# Patient Record
Sex: MALE | Race: WHITE | Employment: FULL TIME | URBAN - METROPOLITAN AREA
[De-identification: names, ages, dates, MRNs, and addresses within clinical notes are randomized per-mention and may not be internally consistent; named-entity substitution may affect disease eponyms.]

---

## 2019-04-02 ENCOUNTER — OFFICE VISIT (OUTPATIENT)
Dept: ENDOCRINOLOGY | Age: 49
End: 2019-04-02

## 2019-04-02 VITALS
BODY MASS INDEX: 33.21 KG/M2 | HEART RATE: 77 BPM | SYSTOLIC BLOOD PRESSURE: 148 MMHG | HEIGHT: 72 IN | DIASTOLIC BLOOD PRESSURE: 98 MMHG | WEIGHT: 245.2 LBS

## 2019-04-02 DIAGNOSIS — E78.5 HYPERLIPIDEMIA LDL GOAL <100: ICD-10-CM

## 2019-04-02 DIAGNOSIS — R03.0 ELEVATED BLOOD-PRESSURE READING WITHOUT DIAGNOSIS OF HYPERTENSION: ICD-10-CM

## 2019-04-02 LAB
GLUCOSE POC: 97 MG/DL
HBA1C MFR BLD HPLC: 8.8 %

## 2019-04-02 RX ORDER — INSULIN LISPRO 100 [IU]/ML
INJECTION, SOLUTION INTRAVENOUS; SUBCUTANEOUS
COMMUNITY
End: 2019-09-24 | Stop reason: SDUPTHER

## 2019-04-02 NOTE — PROGRESS NOTES
Chief Complaint Patient presents with  Diabetes  
  pcp is [facility] and pharmacy confimred History of Present Illness: Cora Lipscomb is a 50 y.o. male who is a new patient for evaluation of diabetes. Was being seen by Dr. Loreta Cai and last visit was with her NP in June 2018. Was diagnosed with diabetes around age 27 and initially was put on pills but was seen by an endo who confirmed he had Type 1 diabetes and was taken off pills and put on insulin and has remained on this ever since. Recalls being on lantus initially and then was changed to toujeo in the past 2-3 years. Has always been on humalog. Current regimen is toujeo 36 units at night and humalog 1:10 for carbs and 1:40> 140 for correction. Checks blood sugars 1-3 times per day and readings run from low overnight a few times a month to over 200 fasting depending on how late he ate at night and what he ate. If he doesn't eat for hours during the day, doesn't tend to drop. Can have times during the day where he eats and is on the go and may not dose the humalog. May underdose his humalog at night to avoid low blood sugars. Most recent Hgb A1c was 8.8% in 4/19 down from 8.9% in 6/18. A typical day is as follows: 
- breakfast: 2 fast food biscuits, usually no other sides 
- lunch: may skip and just have a fiber one bar or pack of nabs, slice of pizza or arby's or burger dustin and get a combo meal 
- dinner: turkey sandwich and some chips, occ pasta, not much rice or potato, may have desserts most nights of the week - beverages: pineapple juice 16 oz per day, diet soda, unsweet tea, G2 
- snacks: may have chips, crackers or cookies late at night and tries to take humalog for this Exercise consists of active on the job or during the remodel process. No history of vascular disease. No history of retinopathy, neuropathy, or nephropathy. Last eye exam was Feb 2018.   States his BP has been intermittently elevated. His blood sugar was down to 56 during our interview and I gave him 8 oz of sweet tea and it was up to 97 by the time he left. Past Medical History:  
Diagnosis Date  Arthritis  Depression  Hyperlipidemia LDL goal <100   
 Skin cancer  Type 1 diabetes (HCC) Age 27 Past Surgical History:  
Procedure Laterality Date  HX ORTHOPAEDIC    
 plantar fasciitis surgery on left foot  HX ORTHOPAEDIC    
 left elbow tendon surgery  HX OTHER SURGICAL  2009  
 bilateral lymphnodectomies axilla  HX OTHER SURGICAL    
 mole removed from back  HX SHOULDER ARTHROSCOPY Bilateral   
 
Current Outpatient Medications Medication Sig  
 insulin glargine,hum.rec.anlog (TOUJEO SOLOSTAR U-300 INSULIN SC) 36 Units by SubCUTAneous route nightly.  insulin lispro (HUMALOG KWIKPEN INSULIN) 100 unit/mL kwikpen by SubCUTAneous route. 1:10 for carbs and 1:40 > 140 for correction No current facility-administered medications for this visit. No Known Allergies Family History Problem Relation Age of Onset  Heart Disease Father CABG  
 Diabetes Sister 15  
     type 1 Social History Socioeconomic History  Marital status:  Spouse name: Not on file  Number of children: Not on file  Years of education: Not on file  Highest education level: Not on file Occupational History  Not on file Social Needs  Financial resource strain: Not on file  Food insecurity:  
  Worry: Not on file Inability: Not on file  Transportation needs:  
  Medical: Not on file Non-medical: Not on file Tobacco Use  Smoking status: Former Smoker  Smokeless tobacco: Current User Types: Chew  Tobacco comment: as a teenager Substance and Sexual Activity  Alcohol use: Yes Comment: a couple beers a month  Drug use: No  
 Sexual activity: Not on file Lifestyle  Physical activity:  
  Days per week: Not on file Minutes per session: Not on file  Stress: Not on file Relationships  Social connections:  
  Talks on phone: Not on file Gets together: Not on file Attends Baptism service: Not on file Active member of club or organization: Not on file Attends meetings of clubs or organizations: Not on file Relationship status: Not on file  Intimate partner violence:  
  Fear of current or ex partner: Not on file Emotionally abused: Not on file Physically abused: Not on file Forced sexual activity: Not on file Other Topics Concern  Not on file Social History Narrative Lives in [facility] with wife and 3 yo son Also has a 22 yo daughter and a 12 yo son by a previous marriage. Works as a  for [address] In the process of remodeling a house in [place] that he plans to move to. Review of Systems: 
- Constitutional Symptoms: no fevers, chills, (+) 40 weight gain over the past several years - Eyes: no blurry vision or double vision - Cardiovascular: no chest pain or palpitations - Respiratory: no cough or shortness of breath 
- Gastrointestinal: no dysphagia or abdominal pain - Musculoskeletal: (+) knee pains - Integumentary: no rashes - Neurological: no numbness, tingling, some sinus headaches - Psychiatric: no depression or anxiety - Endocrine: no heat or cold intolerance, no polyuria or polydipsia Physical Examination: 
Blood pressure (!) 150/91, pulse 77, height 6' (1.829 m), weight 245 lb 3.2 oz (111.2 kg). Repeat manually 146/98 in left arm. 
- General: pleasant, no distress, good eye contact 
- HEENT: no exopthalmos, no periorbital edema, no scleral/conjunctival injection, EOMI, no lid lag or stare 
- Neck: supple, no thyromegaly, masses, lymph nodes, or carotid bruits, no supraclavicular or dorsocervical fat pads - Cardiovascular: regular, normal rate, normal S1 and S2, no murmurs/rubs/gallops, 
- Respiratory: clear to auscultation bilaterally - Gastrointestinal: soft, nontender, nondistended, no masses, no hepatosplenomegaly - Musculoskeletal: no proximal muscle weakness in upper or lower extremities - Integumentary: no acanthosis nigricans, no abdominal striae, no rashes, no edema, no foot ulcers - Neurological: see foot exam 
- Psychiatric: normal mood and affect Diabetic foot exam:  
 
Left Foot: 
 Visual Exam: callous - mild Pulse DP: 2+ (normal) Filament test: reduced sensation Vibratory sensation: Vibratory sensation: diminished Right Foot: 
 Visual Exam: callous - mild Pulse DP: 2+ (normal) Filament test: reduced sensation Vibratory sensation: Vibratory sensation: diminished Data Reviewed: 6/5/18 
- Hgb A1c 8.9% 
- lipids: total 182 ,  HDL 45, TG 77,  
- ALT 21, AST 18 
- BUN/Cr 18/1.06 
- total testosterone 547 
- vitamin D 37 
- B12 786 Component Latest Ref Rng & Units 4/2/2019  
 
      3:26 PM  
Hemoglobin A1c (POC) 
    % 8.8 Component Latest Ref Rng & Units 4/2/2019  
 
      4:32 PM  
GLUCOSE,FAST - POC 
    mg/dL 97 Assessment/Plan: 1. Diabetes mellitus type 1, uncontrolled, without [facility] his most recent Hgb A1c was 8.8% in 4/19. I'm concerned that he doesn't have enough meal time insulin due to his carb ratio not being aggressive enough so will change this. Will keep his toujeo the same for now. He needs to do a better job of making sure he gets his humalog every time he eats. - cont toujeo 36 units at night 
- change humalog to 1:8 for carbs during the day and 1:10 if he eats after 10pm 
- cont humalog 1:40 > 140 for correction - check bs 3 times daily 
- foot exam done 4/19 
- overdue for eye exam--last 2/18 
- check cmp and microalbumin today 
- check A1c and bmp prior to next visit 2. Hyperlipidemia LDL goal <100:  in 6/18 
- check lipids today and prior to next visit 3. Elevated blood-pressure reading without diagnosis of hypertension: 
- monitor home blood pressure readings We spent 60 minutes of face to face time together and > 50% of the time was spent in counseling on diabetes management and determining what changes to make to his insulin regimen. Patient Instructions 1) Your A1c is 8.8% which is down slightly from 8.9% in June. Our goal is 7% or less which is an average sugar of 154 or less. 2) Keep taking toujeo 36 units at night. 3) Try dosing humalog 1 unit for 8 grams of carbs during the day but if you eat after 10pm, dose 1 for 10 to be safe. 4) Try to write down some sugars before meals and how much insulin you took and what you ate so we can identify trends. Also write down if you didn't dose so we know what caused your spikes. 5) Stay on 1 unit for every 40 points over 140 for correction for now. 6) Start monitoring blood pressure about 2-3 times per week at alternating times either in the morning or evening after resting for 5 minutes and sitting upright in a chair with your arm at heart level. Please let me know if you are having readings over 140 on the top number or 90 on the bottom number. 7) I will call you or send you a letter with your lab results. 8) Let me know when you need refills on your meds. 9) If you have a low going into a meal, plan on counting the carbs and eat first to get your sugar up and then take 2 units less. 10) I will mail you a lab slip. Please put this in the glove compartment and I will send you a letter to remind you to have your labs drawn prior to the next visit. Follow-up and Dispositions · Return in about 3 months (around 7/2/2019) for ok to use 12:10. Copy sent to: 
None at this time Lab follow up: 4/6/19 Sent him the following message in a letter: 
 
MICROALBUMIN, UR, RAND W/ MICROALB/CREAT RATIO (Collected: 4/2/2019  4:12 PM) Result Value Ref Range Creatinine, urine 188.6 Not Estab. mg/dL Microalbumin, urine 50.3 Not Estab. ug/mL Microalb/Creat ratio (ug/mg creat.) 26.7 0.0 - 30.0 mg/g creat Comment:  
                        Normal:                0.0 -  30.0 Albuminuria:          31.0 - 300.0 Clinical albuminuria:       >300.0 Narrative Performed at:  [address]  [id number] : Reji Bang MD, Phone:  [phone] METABOLIC PANEL, COMPREHENSIVE (Collected: 4/2/2019  4:12 PM) Result Value Ref Range Glucose 65 65 - 99 mg/dL BUN 19 6 - 24 mg/dL Creatinine 0.85 0.76 - 1.27 mg/dL GFR est non- >59 mL/min/1.73 GFR est  >59 mL/min/1.73  
 BUN/Creatinine ratio 22 (H) 9 - 20 Sodium 144 134 - 144 mmol/L Potassium 4.1 3.5 - 5.2 mmol/L Chloride 102 96 - 106 mmol/L  
 CO2 25 20 - 29 mmol/L Calcium 9.7 8.7 - 10.2 mg/dL Protein, total 7.1 6.0 - 8.5 g/dL Albumin 4.8 3.5 - 5.5 g/dL GLOBULIN, TOTAL 2.3 1.5 - 4.5 g/dL A-G Ratio 2.1 1.2 - 2.2 Bilirubin, total 0.4 0.0 - 1.2 mg/dL Alk. phosphatase 70 39 - 117 IU/L  
 AST (SGOT) 21 0 - 40 IU/L  
 ALT (SGPT) 27 0 - 44 IU/L Narrative Performed at:  [address]  [id number] : Reji Bang MD, Phone:  [phone] LIPID PANEL (Collected: 4/2/2019  4:12 PM) Result Value Ref Range Cholesterol, total 222 (H) 100 - 199 mg/dL Triglyceride 92 0 - 149 mg/dL HDL Cholesterol 54 >39 mg/dL VLDL, calculated 18 5 - 40 mg/dL LDL, calculated 150 (H) 0 - 99 mg/dL Narrative Performed at:  [address]  [id number] : Reji Bang MD, Phone:  [phone] AMB POC GLUCOSE BLOOD, BY GLUCOSE MONITORING DEVICE (Collected: 4/2/2019  4:32 PM) Result Value Ref Range Glucose POC 97 mg/dL CVD REPORT (Collected: 4/2/2019  4:12 PM) Result Value Ref Range INTERPRETATION Note Comment:  
   Medical Director's Note: Patient Last Name has been 
corrected on 4/3/2019, was KENTON and now is VICK. Please review this report in its entirety, since changes to 
patient demographics may affect result interpretation(s) 
and/or treatment/follow-up suggestions. Medical Director's Note: Patient First Name has been 
corrected on 4/3/2019, was Dianne Quintana and now is AMELIA. Please review this report in its entirety, since changes to 
patient demographics may affect result interpretation(s) 
and/or treatment/follow-up suggestions. Supplemental report is available. Narrative Performed at:  [address] [place] [address]  [id number] : Arin Delarosa MD, Phone:  [phone] I wanted to update you on your recent lab results: 
 
------------------------------------------------------------------------------------------------------------------- Total Cholesterol is the total number of cholesterol particles in your blood. Goal is less than 200. Triglycerides are the short term fats in your blood. Goal is less than 150. HDL is the good cholesterol in your blood. Goal is more than 50 if you are a woman and 40 if you are a man. LDL is the bad cholesterol in your blood. Goal is less than 100 unless you have heart disease and then goal is under 70. We may need to start a medication for cholesterol at the next visit but let's see the effect of controlling your diabetes better and working on your diet first. 
 
Continue to follow a low cholesterol diet.   Try to limit the amount of fried foods, fatty foods, butter, gravy, red meat, ice cream, cheese, and eggs in your diet, which are all high in cholesterol. 
------------------------------------------------------------------------------------------------------------------- 
 BUN and creatinine are markers of kidney function. Your values are normal. 
------------------------------------------------------------------------------------------------------------------- 
ALT and AST are markers of liver function. Your values are normal. 
------------------------------------------------------------------------------------------------------------------- Microalbumin/creatinine ratio is a marker of the amount of protein in your urine. Goal is less than 30. Your value is normal. This indicates that your kidneys are not being affected by your uncontrolled diabetes and/or blood pressure.

## 2019-04-02 NOTE — PATIENT INSTRUCTIONS
1) Your A1c is 8.8% which is down slightly from 8.9% in June. Our goal is 7% or less which is an average sugar of 154 or less. 2) Keep taking toujeo 36 units at night. 3) Try dosing humalog 1 unit for 8 grams of carbs during the day but if you eat after 10pm, dose 1 for 10 to be safe. 4) Try to write down some sugars before meals and how much insulin you took and what you ate so we can identify trends. Also write down if you didn't dose so we know what caused your spikes. 5) Stay on 1 unit for every 40 points over 140 for correction for now. 6) Start monitoring blood pressure about 2-3 times per week at alternating times either in the morning or evening after resting for 5 minutes and sitting upright in a chair with your arm at heart level. Please let me know if you are having readings over 140 on the top number or 90 on the bottom number. 7) I will call you or send you a letter with your lab results. 8) Let me know when you need refills on your meds. 9) If you have a low going into a meal, plan on counting the carbs and eat first to get your sugar up and then take 2 units less. 10) I will mail you a lab slip. Please put this in the glove compartment and I will send you a letter to remind you to have your labs drawn prior to the next visit.

## 2019-04-03 LAB
ALBUMIN SERPL-MCNC: 4.8 G/DL (ref 3.5–5.5)
ALBUMIN/CREAT UR: 26.7 MG/G CREAT (ref 0–30)
ALBUMIN/GLOB SERPL: 2.1 {RATIO} (ref 1.2–2.2)
ALP SERPL-CCNC: 70 IU/L (ref 39–117)
ALT SERPL-CCNC: 27 IU/L (ref 0–44)
AST SERPL-CCNC: 21 IU/L (ref 0–40)
BILIRUB SERPL-MCNC: 0.4 MG/DL (ref 0–1.2)
BUN SERPL-MCNC: 19 MG/DL (ref 6–24)
BUN/CREAT SERPL: 22 (ref 9–20)
CALCIUM SERPL-MCNC: 9.7 MG/DL (ref 8.7–10.2)
CHLORIDE SERPL-SCNC: 102 MMOL/L (ref 96–106)
CHOLEST SERPL-MCNC: 222 MG/DL (ref 100–199)
CO2 SERPL-SCNC: 25 MMOL/L (ref 20–29)
CREAT SERPL-MCNC: 0.85 MG/DL (ref 0.76–1.27)
CREAT UR-MCNC: 188.6 MG/DL
GLOBULIN SER CALC-MCNC: 2.3 G/DL (ref 1.5–4.5)
GLUCOSE SERPL-MCNC: 65 MG/DL (ref 65–99)
HDLC SERPL-MCNC: 54 MG/DL
INTERPRETATION, 910389: NORMAL
LDLC SERPL CALC-MCNC: 150 MG/DL (ref 0–99)
MICROALBUMIN UR-MCNC: 50.3 UG/ML
POTASSIUM SERPL-SCNC: 4.1 MMOL/L (ref 3.5–5.2)
PROT SERPL-MCNC: 7.1 G/DL (ref 6–8.5)
SODIUM SERPL-SCNC: 144 MMOL/L (ref 134–144)
TRIGL SERPL-MCNC: 92 MG/DL (ref 0–149)
VLDLC SERPL CALC-MCNC: 18 MG/DL (ref 5–40)

## 2019-06-25 LAB
BUN SERPL-MCNC: 17 MG/DL (ref 6–24)
BUN/CREAT SERPL: 16 (ref 9–20)
CALCIUM SERPL-MCNC: 9.1 MG/DL (ref 8.7–10.2)
CHLORIDE SERPL-SCNC: 98 MMOL/L (ref 96–106)
CHOLEST SERPL-MCNC: 191 MG/DL (ref 100–199)
CO2 SERPL-SCNC: 24 MMOL/L (ref 20–29)
CREAT SERPL-MCNC: 1.09 MG/DL (ref 0.76–1.27)
EST. AVERAGE GLUCOSE BLD GHB EST-MCNC: 226 MG/DL
GLUCOSE SERPL-MCNC: 289 MG/DL (ref 65–99)
HBA1C MFR BLD: 9.5 % (ref 4.8–5.6)
HDLC SERPL-MCNC: 47 MG/DL
INTERPRETATION, 910389: NORMAL
LDLC SERPL CALC-MCNC: 120 MG/DL (ref 0–99)
Lab: NORMAL
POTASSIUM SERPL-SCNC: 4.5 MMOL/L (ref 3.5–5.2)
SODIUM SERPL-SCNC: 136 MMOL/L (ref 134–144)
TRIGL SERPL-MCNC: 118 MG/DL (ref 0–149)
VLDLC SERPL CALC-MCNC: 24 MG/DL (ref 5–40)

## 2019-07-08 ENCOUNTER — OFFICE VISIT (OUTPATIENT)
Dept: ENDOCRINOLOGY | Age: 49
End: 2019-07-08

## 2019-07-08 VITALS
BODY MASS INDEX: 31.62 KG/M2 | HEIGHT: 73 IN | WEIGHT: 238.6 LBS | SYSTOLIC BLOOD PRESSURE: 140 MMHG | HEART RATE: 79 BPM | DIASTOLIC BLOOD PRESSURE: 94 MMHG

## 2019-07-08 DIAGNOSIS — E78.5 HYPERLIPIDEMIA LDL GOAL <100: ICD-10-CM

## 2019-07-08 DIAGNOSIS — R03.0 ELEVATED BLOOD-PRESSURE READING WITHOUT DIAGNOSIS OF HYPERTENSION: ICD-10-CM

## 2019-07-08 RX ORDER — INSULIN DEGLUDEC 100 U/ML
INJECTION, SOLUTION SUBCUTANEOUS
Qty: 1 PEN | Refills: 0 | Status: SHIPPED | COMMUNITY
Start: 2019-07-08 | End: 2019-12-09

## 2019-07-08 NOTE — PROGRESS NOTES
Chief Complaint   Patient presents with    Diabetes     pcp is [facility] and pharmacy confirmed     History of Present Illness: Krystal Nogueira is a 50 y.o. male here for follow up of diabetes. Weight down 7 lbs since last visit in 4/19. Has still been doing his humalog at 1:10 and then will add a few units. Still taking toujeo 36 units at night. Did run out 3 days ago and has a new shipment on its way and sugars are in the 300s over the weekend out of long acting insulin. Can have fasting sugars in the 200s if he eats dinner late. Has only had a few lows when he is active on the job but nothing on a regular basis. Admits to still eating a lot of sweets. Hasn't checked his BP outside of the doctor's office. Current Outpatient Medications   Medication Sig    insulin glargine,hum.rec.anlog (TOUJEO SOLOSTAR U-300 INSULIN SC) 36 Units by SubCUTAneous route nightly.  insulin lispro (HUMALOG KWIKPEN INSULIN) 100 unit/mL kwikpen by SubCUTAneous route. 1:10 for carbs and 1:40 > 140 for correction     No current facility-administered medications for this visit. No Known Allergies     Review of Systems:  - Eyes: no blurry vision or double vision  - Cardiovascular: no chest pain  - Respiratory: no shortness of breath  - Musculoskeletal: no myalgias  - Neurological: no numbness/tingling in extremities    Physical Examination:  Blood pressure (!) 140/94, pulse 79, height 6' 1\" (1.854 m), weight 238 lb 9.6 oz (108.2 kg).  Repeat manually 158/98 in left arm.  - General: pleasant, no distress, good eye contact   - Neck: no carotid bruits  - Cardiovascular: regular, normal rate, nl s1 and s2, no m/r/g,   - Respiratory: clear bilaterally  - Integumentary: no edema,   - Psychiatric: normal mood and affect    Data Reviewed:   Component      Latest Ref Rng & Units 6/24/2019 6/24/2019 6/24/2019           9:09 AM  9:09 AM  9:09 AM   Glucose      65 - 99 mg/dL   289 (H)   BUN      6 - 24 mg/dL   17 Creatinine      0.76 - 1.27 mg/dL   1.09   GFR est non-AA      >59 mL/min/1.73   80   GFR est AA      >59 mL/min/1.73   92   BUN/Creatinine ratio      9 - 20   16   Sodium      134 - 144 mmol/L   136   Potassium      3.5 - 5.2 mmol/L   4.5   Chloride      96 - 106 mmol/L   98   CO2      20 - 29 mmol/L   24   Calcium      8.7 - 10.2 mg/dL   9.1   Cholesterol, total      100 - 199 mg/dL  191    Triglyceride      0 - 149 mg/dL  118    HDL Cholesterol      >39 mg/dL  47    VLDL, calculated      5 - 40 mg/dL  24    LDL, calculated      0 - 99 mg/dL  120 (H)    Hemoglobin A1c, (calculated)      4.8 - 5.6 % 9.5 (H)     Estimated average glucose      mg/dL 226         Assessment/Plan:     1. Diabetes mellitus type 1, uncontrolled, [facility] complications [facility] his most recent Hgb A1c was 9.5% in 6/19 up from 8.8% in 4/19. A1c is higher due to diet but will slightly increase toujeo to get fasting sugars lower. - increase toujeo to 38 units at night  - cont humalog 1:8 for carbs during the day and 1:10 if he eats after 10pm  - cont humalog 1:40 > 140 for correction  - check bs 3 times daily  - foot exam done 4/19  - microalbumin nl 4/19  - overdue for eye exam--last 2/18  - check A1c and cmp prior to next visit        2. Hyperlipidemia LDL goal <100:  in 6/18, up to 150 in 4/19, down to 120 in 6/19  - check lipids prior to next visit        3. Elevated blood-pressure reading without diagnosis of hypertension: BP up again today  - monitor home blood pressure readings        Patient Instructions   1) Your A1c josé from 8.8% to 9.5% so let's try going up on the toujeo to 38 units daily. 2) Try to dose the humalog at 1:8 for carbs to prevent spikes but at bedtime dose 1:10 to avoid lows overnight. 3) For the next week, take the tresiba sample at 38 units at night. 4) Your LDL (bad cholesterol) dropped from 150 to 120 which is a good improvement.     Try to limit the amount of fried foods, fatty foods, butter, gravy, red meat, ice cream, cheese and eggs in your diet, which are all high in cholesterol. 5) BUN and creatinine are markers of kidney function. Your values are normal.    6) Start monitoring blood pressure about 2-3 times per week at alternating times either in the morning or evening after resting for 5 minutes and sitting upright in a chair with your arm at heart level. Please let me know if you are having readings over 140 on the top number or 90 on the bottom number. Follow-up and Dispositions    · Return in about 5 months (around 12/8/2019).                Copy sent to:  None at this time

## 2019-07-08 NOTE — PATIENT INSTRUCTIONS
1) Your A1c josé from 8.8% to 9.5% so let's try going up on the toujeo to 38 units daily. 2) Try to dose the humalog at 1:8 for carbs to prevent spikes but at bedtime dose 1:10 to avoid lows overnight. 3) For the next week, take the tresiba sample at 38 units at night. 4) Your LDL (bad cholesterol) dropped from 150 to 120 which is a good improvement. Try to limit the amount of fried foods, fatty foods, butter, gravy, red meat, ice cream, cheese and eggs in your diet, which are all high in cholesterol. 5) BUN and creatinine are markers of kidney function. Your values are normal.    6) Start monitoring blood pressure about 2-3 times per week at alternating times either in the morning or evening after resting for 5 minutes and sitting upright in a chair with your arm at heart level. Please let me know if you are having readings over 140 on the top number or 90 on the bottom number.

## 2019-09-24 RX ORDER — INSULIN LISPRO 100 [IU]/ML
INJECTION, SOLUTION INTRAVENOUS; SUBCUTANEOUS
Qty: 45 ML | Refills: 3 | Status: SHIPPED | OUTPATIENT
Start: 2019-09-24 | End: 2020-02-03 | Stop reason: SDUPTHER

## 2019-09-24 RX ORDER — BLOOD SUGAR DIAGNOSTIC
STRIP MISCELLANEOUS
Qty: 300 STRIP | Refills: 3 | Status: SHIPPED | OUTPATIENT
Start: 2019-09-24 | End: 2020-02-03 | Stop reason: SDUPTHER

## 2019-09-24 NOTE — TELEPHONE ENCOUNTER
----- Message from Bong Bustamante sent at 9/24/2019  2:36 PM EDT -----  Regarding: Dr Augustine Brown  Medication Refill    Caller (if not patient):      Relationship of caller (if not patient):      Best contact number(s): [phone]      Name of medication and dosage if known: Humalog and test strips      Is patient out of this medication (yes/no): yes/ test strips      Pharmacy name: [facility] listed in chart? (yes/no):   Pharmacy phone Radha Bo      Details to clarify the request:      Bong Bustamante

## 2019-09-24 NOTE — TELEPHONE ENCOUNTER
I spoke with patient and he stated that he uses the verio one touch verio meter . I informed him that the strips and insulin will be sent to his mail order.

## 2019-12-06 LAB
ALBUMIN SERPL-MCNC: 4.4 G/DL (ref 3.5–5.5)
ALBUMIN/GLOB SERPL: 2.3 {RATIO} (ref 1.2–2.2)
ALP SERPL-CCNC: 61 IU/L (ref 39–117)
ALT SERPL-CCNC: 20 IU/L (ref 0–44)
AST SERPL-CCNC: 20 IU/L (ref 0–40)
BILIRUB SERPL-MCNC: 0.3 MG/DL (ref 0–1.2)
BUN SERPL-MCNC: 21 MG/DL (ref 6–24)
BUN/CREAT SERPL: 23 (ref 9–20)
CALCIUM SERPL-MCNC: 9 MG/DL (ref 8.7–10.2)
CHLORIDE SERPL-SCNC: 102 MMOL/L (ref 96–106)
CHOLEST SERPL-MCNC: 188 MG/DL (ref 100–199)
CO2 SERPL-SCNC: 27 MMOL/L (ref 20–29)
CREAT SERPL-MCNC: 0.9 MG/DL (ref 0.76–1.27)
EST. AVERAGE GLUCOSE BLD GHB EST-MCNC: 200 MG/DL
GLOBULIN SER CALC-MCNC: 1.9 G/DL (ref 1.5–4.5)
GLUCOSE SERPL-MCNC: 110 MG/DL (ref 65–99)
HBA1C MFR BLD: 8.6 % (ref 4.8–5.6)
HDLC SERPL-MCNC: 45 MG/DL
INTERPRETATION, 910389: NORMAL
LDLC SERPL CALC-MCNC: 113 MG/DL (ref 0–99)
Lab: NORMAL
POTASSIUM SERPL-SCNC: 4.6 MMOL/L (ref 3.5–5.2)
PROT SERPL-MCNC: 6.3 G/DL (ref 6–8.5)
SODIUM SERPL-SCNC: 144 MMOL/L (ref 134–144)
TRIGL SERPL-MCNC: 150 MG/DL (ref 0–149)
VLDLC SERPL CALC-MCNC: 30 MG/DL (ref 5–40)

## 2019-12-09 ENCOUNTER — OFFICE VISIT (OUTPATIENT)
Dept: ENDOCRINOLOGY | Age: 49
End: 2019-12-09

## 2019-12-09 VITALS
HEART RATE: 69 BPM | BODY MASS INDEX: 31.86 KG/M2 | HEIGHT: 73 IN | WEIGHT: 240.4 LBS | SYSTOLIC BLOOD PRESSURE: 136 MMHG | DIASTOLIC BLOOD PRESSURE: 90 MMHG

## 2019-12-09 DIAGNOSIS — E78.5 HYPERLIPIDEMIA LDL GOAL <100: ICD-10-CM

## 2019-12-09 DIAGNOSIS — R03.0 ELEVATED BLOOD-PRESSURE READING WITHOUT DIAGNOSIS OF HYPERTENSION: ICD-10-CM

## 2019-12-09 NOTE — PROGRESS NOTES
Chief Complaint   Patient presents with    Diabetes     PCP and Pharmacy verified     History of Present Illness: Jean Claude Ford is a 52 y.o. male here for follow up of diabetes. Weight up 2 lbs since last visit in 7/19. Has been taking toujeo 38 units at night and dosing humalog 1:10 but tends to add about a unit to help get closer to 1:8 for food. His schedule has been erratic and sometimes will have dinner at 5pm and other times at 11pm.  The other night went to [employer] and missed his humalog before dinner and sugar was almost 500 by bedtime and then took a correction dose and down to 96 in the morning. Does have some fasting sugars in the 100-150 range and other times can be \"hi\" if he falls asleep at night and forgets his toujeo which isn't that often. Still has a lot of 200-300s. Didn't get a chance to check some home BP readings. Current Outpatient Medications   Medication Sig    insulin lispro (HUMALOG KWIKPEN INSULIN) 100 unit/mL kwikpen Inject 1:8 for carbs and 1:40 > 140 for correction. Max 50 units per day    ONETOUCH VERIO strip Test sugar 3 times daily.  insulin glargine,hum.rec.anlog (TOUJEO SOLOSTAR U-300 INSULIN SC) 38 Units by SubCUTAneous route nightly. No current facility-administered medications for this visit. No Known Allergies     Review of Systems:  - Eyes: no blurry vision or double vision  - Cardiovascular: no chest pain  - Respiratory: no shortness of breath  - Musculoskeletal: no myalgias  - Neurological: no numbness/tingling in extremities    Physical Examination:  Blood pressure 136/90, pulse 69, height 6' 1\" (1.854 m), weight 240 lb 6.4 oz (109 kg).   - General: pleasant, no distress, good eye contact   - Neck: no carotid bruits  - Cardiovascular: regular, normal rate, nl s1 and s2, no m/r/g,   - Respiratory: clear bilaterally  - Integumentary: no edema,   - Psychiatric: normal mood and affect    Data Reviewed:   Component      Latest Ref Rng & Units 12/5/2019 12/5/2019 12/5/2019           3:54 PM  3:54 PM  3:54 PM   Glucose      65 - 99 mg/dL 110 (H)     BUN      6 - 24 mg/dL 21     Creatinine      0.76 - 1.27 mg/dL 0.90     GFR est non-AA      >59 mL/min/1.73 100     GFR est AA      >59 mL/min/1.73 116     BUN/Creatinine ratio      9 - 20 23 (H)     Sodium      134 - 144 mmol/L 144     Potassium      3.5 - 5.2 mmol/L 4.6     Chloride      96 - 106 mmol/L 102     CO2      20 - 29 mmol/L 27     Calcium      8.7 - 10.2 mg/dL 9.0     Protein, total      6.0 - 8.5 g/dL 6.3     Albumin      3.5 - 5.5 g/dL 4.4     GLOBULIN, TOTAL      1.5 - 4.5 g/dL 1.9     A-G Ratio      1.2 - 2.2 2.3 (H)     Bilirubin, total      0.0 - 1.2 mg/dL 0.3     Alk. phosphatase      39 - 117 IU/L 61     AST      0 - 40 IU/L 20     ALT (SGPT)      0 - 44 IU/L 20     Cholesterol, total      100 - 199 mg/dL  188    Triglyceride      0 - 149 mg/dL  150 (H)    HDL Cholesterol      >39 mg/dL  45    VLDL, calculated      5 - 40 mg/dL  30    LDL, calculated      0 - 99 mg/dL  113 (H)    Hemoglobin A1c, (calculated)      4.8 - 5.6 %   8.6 (H)   Estimated average glucose      mg/dL   200       Assessment/Plan:     1. Diabetes mellitus type 1, uncontrolled, without complications ([facility]): his most recent Hgb A1c was 8.6% in 12/19 down from 9.5% in 6/19 up from 8.8% in 4/19. A1c is coming down slowly but needs to focus on compliance before making any changes. - cont toujeo 38 units at night  - cont humalog 1:8 for carbs during the day and 1:10 if he eats after 10pm  - cont humalog 1:40 > 140 for correction  - check bs 3 times daily  - foot exam done 4/19  - microalbumin nl 4/19  - overdue for eye exam--last 2/18  - check A1c and cmp and microalbumin prior to next visit        2. Hyperlipidemia LDL goal <100:  in 6/18, up to 150 in 4/19, down to 120 in 6/19 and 113 in 12/19  - check lipids prior to next visit        3.  Elevated blood-pressure reading without diagnosis of hypertension: BP up again today but better on repeat  - monitor home blood pressure readings        Patient Instructions   1) Start monitoring blood pressure about 2-3 times per week at alternating times either in the morning or evening after resting for 5 minutes and sitting upright in a chair with your arm at heart level. Please let me know if you are having readings over 140 on the top number or 90 on the bottom number. 2) You can try aleve 1 tab at night (or 2 motrin/ibuprofen) for pain but try to not take this more than 3-4 times a week if possible. 3) Your Hemoglobin A1c (3 month test of blood sugar) came down from 9.5% to 8.6%. 4) I think your current doses of toujeo and humalog look good so we need to focus on compliance with insulin at night and getting the humalog before you eat to prevent the spikes that are keeping your A1c too high. 5) Your liver and kidney are normal and your cholesterol is stable.               Copy sent to:  None at this time

## 2019-12-09 NOTE — PATIENT INSTRUCTIONS
1) Start monitoring blood pressure about 2-3 times per week at alternating times either in the morning or evening after resting for 5 minutes and sitting upright in a chair with your arm at heart level. Please let me know if you are having readings over 140 on the top number or 90 on the bottom number. 2) You can try aleve 1 tab at night (or 2 motrin/ibuprofen) for pain but try to not take this more than 3-4 times a week if possible. 3) Your Hemoglobin A1c (3 month test of blood sugar) came down from 9.5% to 8.6%. 4) I think your current doses of toujeo and humalog look good so we need to focus on compliance with insulin at night and getting the humalog before you eat to prevent the spikes that are keeping your A1c too high. 5) Your liver and kidney are normal and your cholesterol is stable.

## 2020-02-03 RX ORDER — INSULIN LISPRO 100 [IU]/ML
INJECTION, SOLUTION INTRAVENOUS; SUBCUTANEOUS
Qty: 45 ML | Refills: 3 | Status: SHIPPED | OUTPATIENT
Start: 2020-02-03 | End: 2020-06-01

## 2020-02-03 RX ORDER — BLOOD SUGAR DIAGNOSTIC
STRIP MISCELLANEOUS
Qty: 300 STRIP | Refills: 3 | Status: SHIPPED | OUTPATIENT
Start: 2020-02-03 | End: 2021-05-11

## 2020-02-03 NOTE — TELEPHONE ENCOUNTER
Patient stated that he would like all 3 of his medications on file to [facility]. He stated that they will all be delivered at the same time and he will not have to pay two different delivery charges. I informed him that they would be sent today.

## 2020-02-03 NOTE — TELEPHONE ENCOUNTER
----- Message from Gregor Young sent at 2/3/2020  2:50 PM EST -----  Regarding: Dr Musa Sparks  Medication Refill    Caller (if not patient):      Relationship of caller (if not patient):      Best contact number(s):[phone]      Name of medication and dosage if known: Test strip, Humalog and other medications      Is patient out of this medication (yes/no):no      Pharmacy name: [facility]    Pharmacy listed in chart? (yes/no): not sure  Pharmacy phone number:       Details to clarify the request: Pt is out of refills on his medication and needs to know if they can be called in to the pharmacy.  Dr Lili Tan has not previously written Rx for these medications      Gregor Young

## 2020-05-28 LAB
ALBUMIN SERPL-MCNC: 4.5 G/DL (ref 4–5)
ALBUMIN/CREAT UR: 14 MG/G CREAT (ref 0–29)
ALBUMIN/GLOB SERPL: 2.3 {RATIO} (ref 1.2–2.2)
ALP SERPL-CCNC: 67 IU/L (ref 39–117)
ALT SERPL-CCNC: 16 IU/L (ref 0–44)
AST SERPL-CCNC: 14 IU/L (ref 0–40)
BILIRUB SERPL-MCNC: 0.5 MG/DL (ref 0–1.2)
BUN SERPL-MCNC: 23 MG/DL (ref 6–24)
BUN/CREAT SERPL: 24 (ref 9–20)
CALCIUM SERPL-MCNC: 9.4 MG/DL (ref 8.7–10.2)
CHLORIDE SERPL-SCNC: 100 MMOL/L (ref 96–106)
CHOLEST SERPL-MCNC: 196 MG/DL (ref 100–199)
CO2 SERPL-SCNC: 23 MMOL/L (ref 20–29)
CREAT SERPL-MCNC: 0.95 MG/DL (ref 0.76–1.27)
CREAT UR-MCNC: 197.9 MG/DL
EST. AVERAGE GLUCOSE BLD GHB EST-MCNC: 194 MG/DL
GLOBULIN SER CALC-MCNC: 2 G/DL (ref 1.5–4.5)
GLUCOSE SERPL-MCNC: 252 MG/DL (ref 65–99)
HBA1C MFR BLD: 8.4 % (ref 4.8–5.6)
HDLC SERPL-MCNC: 45 MG/DL
INTERPRETATION, 910389: NORMAL
LDLC SERPL CALC-MCNC: 131 MG/DL (ref 0–99)
Lab: NORMAL
MICROALBUMIN UR-MCNC: 27.4 UG/ML
POTASSIUM SERPL-SCNC: 4.8 MMOL/L (ref 3.5–5.2)
PROT SERPL-MCNC: 6.5 G/DL (ref 6–8.5)
SODIUM SERPL-SCNC: 140 MMOL/L (ref 134–144)
TRIGL SERPL-MCNC: 99 MG/DL (ref 0–149)
VLDLC SERPL CALC-MCNC: 20 MG/DL (ref 5–40)

## 2020-06-01 ENCOUNTER — VIRTUAL VISIT (OUTPATIENT)
Dept: ENDOCRINOLOGY | Age: 50
End: 2020-06-01

## 2020-06-01 DIAGNOSIS — E10.65 UNCONTROLLED TYPE 1 DIABETES MELLITUS WITH HYPERGLYCEMIA (HCC): Primary | ICD-10-CM

## 2020-06-01 DIAGNOSIS — E78.5 HYPERLIPIDEMIA LDL GOAL <100: ICD-10-CM

## 2020-06-01 DIAGNOSIS — R03.0 ELEVATED BLOOD-PRESSURE READING WITHOUT DIAGNOSIS OF HYPERTENSION: ICD-10-CM

## 2020-06-01 RX ORDER — INSULIN LISPRO 100 [IU]/ML
INJECTION, SOLUTION INTRAVENOUS; SUBCUTANEOUS
Qty: 45 ML | Refills: 3
Start: 2020-06-01 | End: 2021-05-11

## 2020-06-01 NOTE — PATIENT INSTRUCTIONS
1) Your Hemoglobin A1c (3 month test of blood sugar) is 8.4% down slightly from 8.6% at last visit. I'm concerned your A1c is staying too high due to spikes after eating from your carb ratio not being aggressive enough. Try dosing your humalog at 1 unit for 6 grams of carbs. Check some 2 hour after eating readings and if they are staying under 180 then this ratio is right (provided you counted your carbs correctly) but if you are over 180, then you may need to try 1 unit for 5 grams of carbs. 2) Your LDL (bad cholesterol) has risen from 113 to 131 and goal is under 100. Try to limit the amount of fried foods, fatty foods, butter, gravy, red meat, ice cream, cheese and eggs in your diet, which are all high in cholesterol. 3) ALT and AST are markers of liver function. Your values are normal. 4) BUN and creatinine are markers of kidney function. Your values are normal. 5) Microalbumin/creatinine ratio is a marker of the amount of protein in your urine. Goal is less than 30. Your value is normal. This indicates that your kidneys are not being affected by your uncontrolled diabetes and/or blood pressure. 6) Please come for a follow up visit on 12/14/20 at 4:10pm in our [place] office. 7) I will mail you a lab slip (enclosed). Please put this in the glove compartment or other safe spot where you keep your medical papers and I will send you a reminder to have your labs drawn prior to next visit.

## 2020-06-01 NOTE — LETTER
6/1/2020 Mr. Pratibha Ferrell [address] [id number] Dear Pratibha Ferrell: 
 
Please find your most recent results below. Resulted Orders MICROALBUMIN, UR, RAND W/ MICROALB/CREAT RATIO (Collected: 5/27/2020  3:37 PM) Result Value Ref Range Creatinine, urine 197.9 Not Estab. mg/dL Microalbumin, urine 27.4 Not Estab. ug/mL Microalb/Creat ratio (ug/mg creat.) 14 0 - 29 mg/g creat Comment:  
                          Normal:                0 -  29 Moderately increased: 30 - 300 Severely increased:       >300 **Please note reference interval change** Narrative Performed at:  [address]  [id number] : Moises Rueda MD, Phone:  [phone] HEMOGLOBIN A1C WITH EAG (Collected: 5/27/2020  3:37 PM) Result Value Ref Range Hemoglobin A1c 8.4 (H) 4.8 - 5.6 % Comment:  
            Prediabetes: 5.7 - 6.4 Diabetes: >6.4 Glycemic control for adults with diabetes: <7.0 Estimated average glucose 194 mg/dL Narrative Performed at:  [address]  [id number] : Moises Rueda MD, Phone:  [phone] METABOLIC PANEL, COMPREHENSIVE (Collected: 5/27/2020  3:37 PM) Result Value Ref Range Glucose 252 (H) 65 - 99 mg/dL BUN 23 6 - 24 mg/dL Creatinine 0.95 0.76 - 1.27 mg/dL GFR est non-AA 94 >59 mL/min/1.73 GFR est  >59 mL/min/1.73  
 BUN/Creatinine ratio 24 (H) 9 - 20 Sodium 140 134 - 144 mmol/L Potassium 4.8 3.5 - 5.2 mmol/L Chloride 100 96 - 106 mmol/L  
 CO2 23 20 - 29 mmol/L Calcium 9.4 8.7 - 10.2 mg/dL Protein, total 6.5 6.0 - 8.5 g/dL Albumin 4.5 4.0 - 5.0 g/dL GLOBULIN, TOTAL 2.0 1.5 - 4.5 g/dL A-G Ratio 2.3 (H) 1.2 - 2.2 Bilirubin, total 0.5 0.0 - 1.2 mg/dL Alk.  phosphatase 67 39 - 117 IU/L  
 AST (SGOT) 14 0 - 40 IU/L  
 ALT (SGPT) 16 0 - 44 IU/L Narrative Performed at:  [address]  [id number] : Fredis Mckinley MD, Phone:  [phone] LIPID PANEL (Collected: 5/27/2020  3:37 PM) Result Value Ref Range Cholesterol, total 196 100 - 199 mg/dL Triglyceride 99 0 - 149 mg/dL HDL Cholesterol 45 >39 mg/dL VLDL, calculated 20 5 - 40 mg/dL LDL, calculated 131 (H) 0 - 99 mg/dL Narrative Performed at:  [address], [place]  [id number] : Fredis Mckinley MD, Phone:  [phone] CVD REPORT (Collected: 5/27/2020  3:37 PM) Result Value Ref Range INTERPRETATION Note Comment:  
   Medical Director's Note: Patient Last Name has been 
corrected on 5/28/2020, was VICK and now is PATTERSON. Please review this report in its entirety, since changes to 
patient demographics may affect result interpretation(s) 
and/or treatment/follow-up suggestions. Supplemental report is available. Narrative Performed at:  [address]  [id number] : Radha Mauro MD, Phone:  [phone] DIABETES PATIENT EDUCATION (Collected: 5/27/2020  3:37 PM) Result Value Ref Range PDF Image Not applicable Narrative Performed at:  [address]  [id number] : Radha Mauro MD, Phone:  [phone]  
 
 
1) Your Hemoglobin A1c (3 month test of blood sugar) is 8.4% down slightly from 8.6% at last visit. I'm concerned your A1c is staying too high due to spikes after eating from your carb ratio not being aggressive enough. Try dosing your humalog at 1 unit for 6 grams of carbs.   Check some 2 hour after eating readings and if they are staying under 180 then this ratio is right (provided you counted your carbs correctly) but if you are over 180, then you may need to try 1 unit for 5 grams of carbs. 2) Your LDL (bad cholesterol) has risen from 113 to 131 and goal is under 100. Try to limit the amount of fried foods, fatty foods, butter, gravy, red meat, ice cream, cheese and eggs in your diet, which are all high in cholesterol. 3) ALT and AST are markers of liver function. Your values are normal. 4) BUN and creatinine are markers of kidney function. Your values are normal. 5) Microalbumin/creatinine ratio is a marker of the amount of protein in your urine. Goal is less than 30. Your value is normal. This indicates that your kidneys are not being affected by your uncontrolled diabetes and/or blood pressure. 6) Please come for a follow up visit on 12/14/20 at 4:10pm in our [place] office. 7) I will mail you a lab slip (enclosed). Please put this in the glove compartment or other safe spot where you keep your medical papers and I will send you a reminder to have your labs drawn prior to next visit. Please call me if you have any questions: [phone] Sincerely, 
 
 
Rylie Maciel MD

## 2020-06-01 NOTE — PROGRESS NOTES
Chief Complaint   Patient presents with    Diabetes     pcp and pharmacy confirmefd       **THIS IS A VIRTUAL VISIT VIA A TELEPHONE ENCOUNTER. PATIENT AGREED TO HAVE THEIR CARE DELIVERED OVER THE PHONE IN PLACE OF THEIR REGULARLY SCHEDULED OFFICE VISIT**    History of Present Illness: Jerel Mullins is a 52 y.o. male here for follow up of diabetes. Has started taking Omega XL 1 tab daily and this has helped with joint pains. Compliant with the toujeo but may still miss the humalog once every 7-14 days. Fasting sugars can be higher in the morning when he has spaghetti and ice cream and has seen other times in the 70-80s and other times can be in the 100-200s. If eats an early supper will take his toujeo at that time and rarely this may cause a low overnight. Weight on his scales is around 232 currently but tends to weigh more by about 8-10 lbs at the doctor's office with all his gear. Feels like he counts carbs pretty accurately but still can spike after eating. No chest pain or shortness of breath or numbness/tingling in the feet or blurry vision. Current Outpatient Medications   Medication Sig    OTHER Omega XL 1 tab daily    ONETOUCH VERIO strip Test sugar 3 times daily.  insulin glargine U-300 conc (TOUJEO SOLOSTAR U-300 INSULIN) 300 unit/mL (1.5 mL) inpn pen 38 Units by SubCUTAneous route nightly. Or as directed up to max of 45 units per day    insulin lispro (HUMALOG KWIKPEN INSULIN) 100 unit/mL kwikpen Inject 1:8 for carbs and 1:40 > 140 for correction. Max 50 units per day     No current facility-administered medications for this visit.       No Known Allergies     Review of Systems: PER HPI    Physical Examination:  - GENERAL: NCAT, Appears well nourished   - EYES: EOMI, non-icteric, no proptosis   - Ear/Nose/Throat: NCAT, no visible inflammation or masses   - CARDIOVASCULAR: no cyanosis, no visible JVD   - RESPIRATORY: respiratory effort normal without any distress or labored breathing   - MUSCULOSKELETAL: Normal ROM of neck and upper extremities observed   - SKIN: No rash on face  - NEUROLOGIC:  No facial asymmetry (Cranial nerve 7 motor function), No gaze palsy   - PSYCHIATRIC: Normal affect, Normal insight and judgement       Data Reviewed:   Component      Latest Ref Rng & Units 5/27/2020 5/27/2020 5/27/2020 5/27/2020           3:37 PM  3:37 PM  3:37 PM  3:37 PM   Glucose      65 - 99 mg/dL  252 (H)     BUN      6 - 24 mg/dL  23     Creatinine      0.76 - 1.27 mg/dL  0.95     GFR est non-AA      >59 mL/min/1.73  94     GFR est AA      >59 mL/min/1.73  108     BUN/Creatinine ratio      9 - 20  24 (H)     Sodium      134 - 144 mmol/L  140     Potassium      3.5 - 5.2 mmol/L  4.8     Chloride      96 - 106 mmol/L  100     CO2      20 - 29 mmol/L  23     Calcium      8.7 - 10.2 mg/dL  9.4     Protein, total      6.0 - 8.5 g/dL  6.5     Albumin      4.0 - 5.0 g/dL  4.5     GLOBULIN, TOTAL      1.5 - 4.5 g/dL  2.0     A-G Ratio      1.2 - 2.2  2.3 (H)     Bilirubin, total      0.0 - 1.2 mg/dL  0.5     Alk. phosphatase      39 - 117 IU/L  67     AST      0 - 40 IU/L  14     ALT      0 - 44 IU/L  16     Cholesterol, total      100 - 199 mg/dL 196      Triglyceride      0 - 149 mg/dL 99      HDL Cholesterol      >39 mg/dL 45      VLDL, calculated      5 - 40 mg/dL 20      LDL, calculated      0 - 99 mg/dL 131 (H)      Creatinine, urine      Not Estab. mg/dL    197.9   Microalbumin, urine      Not Estab. ug/mL    27.4   Microalbumin/Creat. Ratio      0 - 29 mg/g creat    14   Hemoglobin A1c, (calculated)      4.8 - 5.6 %   8.4 (H)    Estimated average glucose      mg/dL   194        Assessment/Plan:     1. Diabetes mellitus type 1, uncontrolled, without complications ([facility]): his most recent Hgb A1c was 8.4% in 5/20 down from 8.6% in 12/19 down from 9.5% in 6/19 up from 8.8% in 4/19.   A1c is coming down slowly but will make carb ratio more aggressive to help with spikes after eaing.  - cont toujeo 38 units at night  - dose humalog 1:6 for carbs during the day and 1:10 if he eats after 10pm  - cont humalog 1:40 > 140 for correction  - check bs 3 times daily  - foot exam done 4/19  - microalbumin nl 4/19  - overdue for eye exam--last 2/18  - check A1c and bmp prior to next visit        2. Hyperlipidemia LDL goal <100:  in 6/18, up to 150 in 4/19, down to 120 in 6/19 and 113 in 12/19  - check lipids prior to next visit        3. Elevated blood-pressure reading without diagnosis of hypertension: BP up again today but better on repeat  - monitor home blood pressure readings        Patient Instructions   1) Your Hemoglobin A1c (3 month test of blood sugar) is 8.4% down slightly from 8.6% at last visit. I'm concerned your A1c is staying too high due to spikes after eating from your carb ratio not being aggressive enough. Try dosing your humalog at 1 unit for 6 grams of carbs. Check some 2 hour after eating readings and if they are staying under 180 then this ratio is right (provided you counted your carbs correctly) but if you are over 180, then you may need to try 1 unit for 5 grams of carbs. 2) Your LDL (bad cholesterol) has risen from 113 to 131 and goal is under 100. Try to limit the amount of fried foods, fatty foods, butter, gravy, red meat, ice cream, cheese and eggs in your diet, which are all high in cholesterol. 3) ALT and AST are markers of liver function. Your values are normal.    4) BUN and creatinine are markers of kidney function. Your values are normal.    5) Microalbumin/creatinine ratio is a marker of the amount of protein in your urine. Goal is less than 30. Your value is normal. This indicates that your kidneys are not being affected by your uncontrolled diabetes and/or blood pressure. 6) Please come for a follow up visit on 12/14/20 at 4:10pm in our [place] office. 7) I will mail you a lab slip (enclosed).   Please put this in the glove compartment or other safe spot where you keep your medical papers and I will send you a reminder to have your labs drawn prior to next visit. Follow-up and Dispositions    · Return for 12/14/20 at 4:10pm.           We spent 22 minutes of total time together during this virtual visit with a telephone encounter. All questions were answered and a copy of the instructions were sent to him via a letter.       Copy sent to:  None at this time

## 2020-11-28 DIAGNOSIS — E10.65 UNCONTROLLED TYPE 1 DIABETES MELLITUS WITH HYPERGLYCEMIA (HCC): ICD-10-CM

## 2020-11-28 DIAGNOSIS — R03.0 ELEVATED BLOOD-PRESSURE READING WITHOUT DIAGNOSIS OF HYPERTENSION: ICD-10-CM

## 2020-11-28 DIAGNOSIS — E78.5 HYPERLIPIDEMIA LDL GOAL <100: ICD-10-CM

## 2020-12-14 ENCOUNTER — VIRTUAL VISIT (OUTPATIENT)
Dept: ENDOCRINOLOGY | Age: 50
End: 2020-12-14
Payer: COMMERCIAL

## 2020-12-14 DIAGNOSIS — E78.5 HYPERLIPIDEMIA LDL GOAL <100: ICD-10-CM

## 2020-12-14 DIAGNOSIS — E10.65 UNCONTROLLED TYPE 1 DIABETES MELLITUS WITH HYPERGLYCEMIA (HCC): Primary | ICD-10-CM

## 2020-12-14 DIAGNOSIS — R03.0 ELEVATED BLOOD-PRESSURE READING WITHOUT DIAGNOSIS OF HYPERTENSION: ICD-10-CM

## 2020-12-14 PROCEDURE — 99442 PR PHYS/QHP TELEPHONE EVALUATION 11-20 MIN: CPT | Performed by: INTERNAL MEDICINE

## 2020-12-14 NOTE — PROGRESS NOTES
Chief Complaint   Patient presents with   [facility] Josue Diabetes     [phone]  phone call    Other     pcp is [facility] and phrmacy confirmed       **THIS IS A VIRTUAL VISIT VIA A TELEPHONE ENCOUNTER. PATIENT AGREED TO HAVE THEIR CARE DELIVERED OVER THE PHONE IN PLACE OF THEIR REGULARLY SCHEDULED OFFICE VISIT**    History of Present Illness: Theresa Trivedi is a 48 y.o. male here for follow up of diabetes. Turned 50 in July. Still tends to dose on a 1:10 ratio for lower carb meals but if he eats more carbs will tend to add more. Still taking 38 units of toujeo at night and if he doesn't eat too many carbs and eats an earlier dinner, his fasting sugars can sometimes go low but if he eats later, his sugars can be higher. Most of his fasting sugars are in the 100-150 range but can have a few over 200 if he had a higher carb meal for dinner. Tends to go conservative on his humalog dose to avoid lows. Doesn't tend to feel his lows as well as he used to and tends to just feel tired when his sugars are in the 40-60s. Current Outpatient Medications   Medication Sig    OTHER Omega XL 2 tab daily    insulin lispro (HumaLOG KwikPen Insulin) 100 unit/mL kwikpen Inject 1:6 for carbs and 1:40 > 140 for correction. Max 50 units per day--Dose change 6/1/20--updated med list--did not send prescription to the pharmacy    ONETOUCH VERIO strip Test sugar 3 times daily.  insulin glargine U-300 conc (TOUJEO SOLOSTAR U-300 INSULIN) 300 unit/mL (1.5 mL) inpn pen 38 Units by SubCUTAneous route nightly. Or as directed up to max of 45 units per day     No current facility-administered medications for this visit. No Known Allergies     Review of Systems: PER HPI    Physical Examination: NOT PERFORMED DUE TO THIS BEING A TELEPHONE CALL      Data Reviewed:   - none new for review    Assessment/Plan:     1.  Diabetes mellitus type 1, uncontrolled, without complications ([facility]): his most recent Hgb A1c was 8.4% in 5/20 down from 8.6% in 12/19 down from 9.5% in 6/19 up from 8.8% in 4/19. No trends to warrant changes  - cont toujeo 38 units at night  - dose humalog 1:6 for carbs during the day and 1:10 if he eats after 10pm  - cont humalog 1:40 > 140 for correction  - check bs 3 times daily  - foot exam done 4/19  - microalbumin nl 5/20  - overdue for eye exam--last 2/18  - check A1c and bmp now  - check Hgb A1c, cmp, and microalbumin prior to next visit        2. Hyperlipidemia LDL goal <100:  in 6/18, up to 150 in 4/19, down to 120 in 6/19 and 113 in 12/19 and up to 131 in 5/20  - check lipids now and prior to next visit        3. Elevated blood-pressure reading without diagnosis of hypertension: BP up again today but better on repeat  - monitor home blood pressure readings        Follow-up and Dispositions    · Return 6/14/21 at 4:10pm.         We spent 11 minutes of total time together during this virtual visit with a telephone encounter. All questions were answered.     Copy sent to:  None at this time

## 2021-05-11 ENCOUNTER — TELEPHONE (OUTPATIENT)
Dept: ENDOCRINOLOGY | Age: 51
End: 2021-05-11

## 2021-05-11 DIAGNOSIS — E10.65 UNCONTROLLED TYPE 1 DIABETES MELLITUS WITH HYPERGLYCEMIA (HCC): Primary | ICD-10-CM

## 2021-05-11 RX ORDER — BLOOD SUGAR DIAGNOSTIC
STRIP MISCELLANEOUS
Qty: 300 STRIP | Refills: 3 | Status: SHIPPED | OUTPATIENT
Start: 2021-05-11 | End: 2022-07-25 | Stop reason: SDUPTHER

## 2021-05-11 RX ORDER — INSULIN LISPRO 100 [IU]/ML
INJECTION, SOLUTION INTRAVENOUS; SUBCUTANEOUS
Qty: 45 ML | Refills: 3 | Status: SHIPPED | OUTPATIENT
Start: 2021-05-11 | End: 2022-07-25 | Stop reason: SDUPTHER

## 2021-05-11 RX ORDER — INSULIN GLARGINE 300 U/ML
INJECTION, SOLUTION SUBCUTANEOUS
Qty: 13.5 ML | Refills: 3 | Status: SHIPPED | OUTPATIENT
Start: 2021-05-11 | End: 2022-07-25 | Stop reason: SDUPTHER

## 2021-05-11 NOTE — TELEPHONE ENCOUNTER
----- Message from Chayo Dunbar sent at 5/11/2021 12:02 PM EDT -----  Regarding: Dr. Arturo Brewster first and last name: Self    Reason for call: R/x    Callback required yes/no and why: Yes, to verify r/x submittal process prior to it be submitted     Best contact number(s): [phone]    Details to clarify the request: Pt stated that he has contacted [facility] for his \"Tujeo\" r/x refills, but was told that he did not have anymore refills. [facility] stated that they have already reached out to Dr. Wei Holcomb office, but has not heard back as of yet. Pt is requesting that Dr. Michael Parkinson fax in a partial r/x to local pharmacy so that he can have some of his medication in the interim until [address] can mail him his full r/x. Pt is also requesting a refill for \"Humalog\" and 1 Touch Verio Flex meter test strips to be submitted through [facility]. Pt is requesting at all r/x's be filled together so that he is not charged an additional co-pay.

## 2021-05-11 NOTE — TELEPHONE ENCOUNTER
Called and spoke with pt this evening. He states that he spoke with [facility] today and if I approve the refills that they sent to me today electronically for all 3 meds (humalog, toujeo, and test strips) then they should ship them out right away so I told him I would do this right now. He is on his last pen of toujeo and opened it about 3-4 days ago and is still taking 38 units per day and I told him he should have 6-7 days left in this pen. If his shipment for some reason does not arrive by the time he is about to run out, then he can call me back (even if it's over the weekend as I'm on call for our practice) and I would approve a temporary supply at his local pharmacy. he voiced understanding of this plan.

## 2021-05-11 NOTE — TELEPHONE ENCOUNTER
----- Message from Ham Blanco sent at 5/11/2021 12:03 PM EDT -----  Regarding: Dr Mike Lorenzo    Medication Refill    Caller (if not patient): N/A      Relationship of caller (if not patient): N/A      Best contact number(s): [phone]      Name of medication and dosage if known: Test strips       Is patient out of this medication (yes/no): no      Pharmacy name: [facility]    Pharmacy listed in chart? (yes/no): yes  Pharmacy phone number: [phone]      Details to clarify the request: Pt is requesting a refill for the Rx for Test strips to be called into his pharmacy.  Pt would like a call back to confirm the request has been done correct before it is called into his pharmacy today      Ham Blanco

## 2021-05-11 NOTE — TELEPHONE ENCOUNTER
----- Message from Lennie Velez sent at 5/11/2021 12:02 PM EDT -----  Regarding: Dr Kulwant Laboy   Medication Refill    Caller (if not patient): N/A      Relationship of caller (if not patient): N/A      Best contact number(s): [phone]      Name of medication and dosage if known: Humalog       Is patient out of this medication (yes/no): no      Pharmacy name: [facility]    Pharmacy listed in chart? (yes/no): yes  Pharmacy phone number: [phone]      Details to clarify the request: Pt is requesting a refill for Rx Humalog to be called into his pharmacy.  Pt would like a call back to confirm the request has been done correct before it is called into his pharmacy today      Lennie Velez

## 2021-05-11 NOTE — TELEPHONE ENCOUNTER
----- Message from Mya Waldron sent at 5/11/2021 12:02 PM EDT -----  Regarding: Dr Vic Cordero   Medication Refill    Caller (if not patient): N/A      Relationship of caller (if not patient): N/A      Best contact number(s): [phone]      Name of medication and dosage if known: Humalog       Is patient out of this medication (yes/no): no      Pharmacy name: [facility]    Pharmacy listed in chart? (yes/no): yes  Pharmacy phone number: [phone]      Details to clarify the request: Pt is requesting a refill for Rx Humalog to be called into his pharmacy.  Pt would like a call back to confirm the request has been done correct before it is called into his pharmacy today      Mya Waldron

## 2021-05-11 NOTE — TELEPHONE ENCOUNTER
----- Message from Jasmyn Kraft sent at 5/11/2021 11:44 AM EDT -----  Regarding: Dr Maxine Arteaga  Medication Refill    Caller (if not patient): N/A      Relationship of caller (if not patient): N/A      Best contact number(s): [phone]      Name of medication and dosage if known: Trini      Is patient out of this medication (yes/no): no, almost      Pharmacy name: [address] listed in chart? (yes/no): yes  Pharmacy phone number: [phone]      Details to clarify the request: Pt is requesting a refill for Rx Toujeo to be called into his pharmacy.  Pt would like a call back to confirm the request has been done correct before it is called into his pharmacy today      Jasmyn Kraft

## 2022-03-19 PROBLEM — E10.65 UNCONTROLLED TYPE 1 DIABETES MELLITUS WITH HYPERGLYCEMIA (HCC): Status: ACTIVE | Noted: 2019-04-02

## 2022-07-25 DIAGNOSIS — E10.65 UNCONTROLLED TYPE 1 DIABETES MELLITUS WITH HYPERGLYCEMIA (HCC): ICD-10-CM

## 2022-07-25 RX ORDER — BLOOD SUGAR DIAGNOSTIC
STRIP MISCELLANEOUS
Qty: 300 STRIP | Refills: 0 | Status: SHIPPED | OUTPATIENT
Start: 2022-07-25

## 2022-07-25 RX ORDER — INSULIN LISPRO 100 [IU]/ML
INJECTION, SOLUTION INTRAVENOUS; SUBCUTANEOUS
Qty: 45 ML | Refills: 0 | Status: SHIPPED | OUTPATIENT
Start: 2022-07-25

## 2022-07-25 RX ORDER — INSULIN GLARGINE 300 U/ML
INJECTION, SOLUTION SUBCUTANEOUS
Qty: 13.5 ML | Refills: 0 | Status: SHIPPED | OUTPATIENT
Start: 2022-07-25

## 2022-11-13 DIAGNOSIS — E10.65 UNCONTROLLED TYPE 1 DIABETES MELLITUS WITH HYPERGLYCEMIA (HCC): ICD-10-CM

## 2022-11-14 RX ORDER — INSULIN LISPRO 100 [IU]/ML
INJECTION, SOLUTION INTRAVENOUS; SUBCUTANEOUS
Qty: 45 ML | Refills: 3 | Status: SHIPPED | OUTPATIENT
Start: 2022-11-14

## 2022-11-14 RX ORDER — INSULIN GLARGINE 300 U/ML
INJECTION, SOLUTION SUBCUTANEOUS
Qty: 13.5 ML | Refills: 3 | Status: SHIPPED | OUTPATIENT
Start: 2022-11-14

## 2022-11-14 RX ORDER — BLOOD SUGAR DIAGNOSTIC
STRIP MISCELLANEOUS
Qty: 300 STRIP | Refills: 3 | Status: SHIPPED | OUTPATIENT
Start: 2022-11-14

## 2024-02-03 ENCOUNTER — TELEPHONE (OUTPATIENT)
Age: 54
End: 2024-02-03

## 2024-02-04 DIAGNOSIS — E10.65 UNCONTROLLED TYPE 1 DIABETES MELLITUS WITH HYPERGLYCEMIA (HCC): Primary | ICD-10-CM

## 2024-02-04 DIAGNOSIS — E78.5 HYPERLIPIDEMIA LDL GOAL <100: ICD-10-CM

## 2024-02-04 RX ORDER — BLOOD SUGAR DIAGNOSTIC
STRIP MISCELLANEOUS
Qty: 300 STRIP | Refills: 0 | Status: SHIPPED | OUTPATIENT
Start: 2024-02-04

## 2024-02-04 RX ORDER — INSULIN LISPRO 100 [IU]/ML
INJECTION, SOLUTION INTRAVENOUS; SUBCUTANEOUS
Qty: 45 ML | Refills: 0 | Status: SHIPPED | OUTPATIENT
Start: 2024-02-04

## 2024-02-04 RX ORDER — INSULIN GLARGINE 300 U/ML
INJECTION, SOLUTION SUBCUTANEOUS
Qty: 13.5 ML | Refills: 0 | Status: SHIPPED | OUTPATIENT
Start: 2024-02-04

## 2024-02-04 NOTE — TELEPHONE ENCOUNTER
Please call to let him know I received a [facility] for his humalog, toujeo, and test strips.  I approved all of these for a 90 day supply with no refills.  He has not been seen in person since Dec 2019 and last virtual visit was Dec 2020 and I won't be able to continue refilling his meds without an office visit.    I put an order directly into the [facility] system to repeat your labs in the 1-2 weeks prior to your next visit so just ask for the order under my name and make a note on your calendar to have these done at that time.      Please fast for your labs.  Don't eat anything after midnight.  However, drink at least 6-8 oz of water the morning of the lab draw to avoid dehydration and to allow for the tech to find your vein easier.    Be prepared to give a urine sample to test for any effect of the diabetes on your kidneys.

## 2024-02-05 NOTE — TELEPHONE ENCOUNTER
Patient notified of message per Dr. Bill and voiced understanding of what was read to them.   Pt has an appt scheduled 03/04/2024.

## 2024-05-24 LAB
ALBUMIN SERPL-MCNC: 4.5 G/DL (ref 3.8–4.9)
ALBUMIN/CREAT UR: 15 MG/G CREAT (ref 0–29)
ALBUMIN/GLOB SERPL: 1.9 {RATIO} (ref 1.2–2.2)
ALP SERPL-CCNC: 66 IU/L (ref 44–121)
ALT SERPL-CCNC: 18 IU/L (ref 0–44)
AST SERPL-CCNC: 17 IU/L (ref 0–40)
BILIRUB SERPL-MCNC: 0.4 MG/DL (ref 0–1.2)
BUN SERPL-MCNC: 26 MG/DL (ref 6–24)
BUN/CREAT SERPL: 25 (ref 9–20)
CALCIUM SERPL-MCNC: 9.7 MG/DL (ref 8.7–10.2)
CHLORIDE SERPL-SCNC: 102 MMOL/L (ref 96–106)
CHOLEST SERPL-MCNC: 203 MG/DL (ref 100–199)
CO2 SERPL-SCNC: 25 MMOL/L (ref 20–29)
CREAT SERPL-MCNC: 1.04 MG/DL (ref 0.76–1.27)
CREAT UR-MCNC: 286.8 MG/DL
EGFRCR SERPLBLD CKD-EPI 2021: 86 ML/MIN/1.73
GLOBULIN SER CALC-MCNC: 2.4 G/DL (ref 1.5–4.5)
GLUCOSE SERPL-MCNC: 214 MG/DL (ref 70–99)
HBA1C MFR BLD: 8 % (ref 4.8–5.6)
HDLC SERPL-MCNC: 45 MG/DL
IMP & REVIEW OF LAB RESULTS: NORMAL
LDLC SERPL CALC-MCNC: 135 MG/DL (ref 0–99)
Lab: NORMAL
MICROALBUMIN UR-MCNC: 41.9 UG/ML
POTASSIUM SERPL-SCNC: 4.3 MMOL/L (ref 3.5–5.2)
PROT SERPL-MCNC: 6.9 G/DL (ref 6–8.5)
SODIUM SERPL-SCNC: 141 MMOL/L (ref 134–144)
TRIGL SERPL-MCNC: 129 MG/DL (ref 0–149)
VLDLC SERPL CALC-MCNC: 23 MG/DL (ref 5–40)

## 2024-05-29 RX ORDER — BLOOD SUGAR DIAGNOSTIC
STRIP MISCELLANEOUS
Qty: 300 STRIP | Refills: 0 | Status: SHIPPED | OUTPATIENT
Start: 2024-05-29

## 2024-05-29 RX ORDER — INSULIN LISPRO 100 [IU]/ML
INJECTION, SOLUTION INTRAVENOUS; SUBCUTANEOUS
Qty: 45 ML | Refills: 0 | Status: SHIPPED | OUTPATIENT
Start: 2024-05-29

## 2024-05-29 RX ORDER — INSULIN GLARGINE 300 U/ML
INJECTION, SOLUTION SUBCUTANEOUS
Qty: 13.5 ML | Refills: 0 | Status: SHIPPED | OUTPATIENT
Start: 2024-05-29

## 2024-06-03 ENCOUNTER — OFFICE VISIT (OUTPATIENT)
Age: 54
End: 2024-06-03
Payer: COMMERCIAL

## 2024-06-03 VITALS
BODY MASS INDEX: 32.59 KG/M2 | HEART RATE: 68 BPM | DIASTOLIC BLOOD PRESSURE: 90 MMHG | HEIGHT: 72 IN | WEIGHT: 240.6 LBS | SYSTOLIC BLOOD PRESSURE: 166 MMHG

## 2024-06-03 DIAGNOSIS — E78.5 HYPERLIPIDEMIA LDL GOAL <100: ICD-10-CM

## 2024-06-03 DIAGNOSIS — E10.65 UNCONTROLLED TYPE 1 DIABETES MELLITUS WITH HYPERGLYCEMIA (HCC): Primary | ICD-10-CM

## 2024-06-03 DIAGNOSIS — R03.0 ELEVATED BLOOD PRESSURE READING IN OFFICE WITHOUT DIAGNOSIS OF HYPERTENSION: ICD-10-CM

## 2024-06-03 PROCEDURE — 99204 OFFICE O/P NEW MOD 45 MIN: CPT | Performed by: INTERNAL MEDICINE

## 2024-06-03 PROCEDURE — 3052F HG A1C>EQUAL 8.0%<EQUAL 9.0%: CPT | Performed by: INTERNAL MEDICINE

## 2024-06-03 NOTE — PATIENT INSTRUCTIONS
1) Start monitoring blood pressure about 2-3 times per week at alternating times either in the morning or evening after resting for 5 minutes and sitting upright in a chair with your arm at heart level. Please let me know if you are having readings over 140 on the top number or 90 on the bottom number.    2) Your Hemoglobin A1c (3 month test of blood sugar) is 8% down from 8.4% in 5/20.  My goal is 7% or less so you are still above goal but the best in 5 years.    3) If you are finding that your sugars are staying over 180 when checked 2 hours after a meal, then your dose of humalog was not enough and either the carb ratio is not aggressive enough or you undercounted your carbs.  Consider dosing closer to 1 unit for 6 grams of carbs for higher carb meals over 40 carbs to prevent spikes.    4) BUN and creatinine are markers of kidney function.  Your BUN (kidney test) was slightly high due to mild dehydration. Drink at least 4 8oz glasses of water everyday to stay hydrated to prevent your level from going higher.    5) If you are going to be active after a meal, plan on taking 3 units of humalog less than what you would have taken to avoid a low sugar.    6) ALT and AST are markers of liver function.  Your values are normal.    7) Microalbumin/creatinine ratio is a marker of the amount of protein in your urine.  Goal is less than 30.  Your value is normal. This indicates that your kidneys are not being affected by your uncontrolled diabetes and/or blood pressure.    8)

## 2024-06-03 NOTE — PROGRESS NOTES
Chief Complaint   Patient presents with    Diabetes     PCP and pharmacy confirmed     History of Present Illness: Bladimir Dumont is a 53 y.o. male here for follow up of diabetes.  Weight stable since last visit in person in 12/19.  Since his last visit with me was a telephone visit in Dec 2020, this will be billed as a new patient visit as it's been more than 3 years since last visit.  No major change to health since last visit.  Still taking toujeo 38 units at night.  Dosing humalog 1:10 for carbs but finds that if he eats more than 40 grams, this dose doesn't seem to be enough.  Does sometimes have lows after taking humalog and being more active.  Has not checked his BP at home any time recently.  Doesn't have any data for review.  May consider using a dexcom or ghassan to help give him better control as he doesn't always feel his lows like he used to.      Current Outpatient Medications   Medication Sig    NONFORMULARY daily [employer] of [employer] supplement    HUMALOG KWIKPEN 100 UNIT/ML SOPN INJECT 1: 8 FOR CARBOHYDRATES AND 1: 40 GREATER THAN 140 FOR CORRECTION, MAXIMUM 50 UNITS DAILY    TOUJEO SOLOSTAR 300 UNIT/ML concentrated injection pen INJECT 38 UNITS UNDER THE SKIN NIGHTLY OR AS DIRECTED UP TO A MAX OF 45 UNITS DAILY    ONETOUCH VERIO strip TEST SUGAR THREE TIMES A DAY     No current facility-administered medications for this visit.     No Known Allergies    Review of Systems: PER HPI    Physical Examination:  Blood pressure (!) 166/90, pulse 68, height 1.829 m (6'), weight 109.1 kg (240 lb 9.6 oz).  General: pleasant, no distress, good eye contact   Neck: no carotid bruits  Cardiovascular: regular, normal rate, nl s1 and s2, no m/r/g,   Respiratory: clear bilaterally  Integumentary: no edema,   Psychiatric: normal mood and affect    Diabetic foot exam:   Left Foot:   Visual Exam: callous- moderate   Pulse DP: 2+ (normal)   Filament test: normal sensation   Vibratory Sensation: normal  Right

## 2024-09-18 RX ORDER — INSULIN LISPRO 100 [IU]/ML
INJECTION, SOLUTION INTRAVENOUS; SUBCUTANEOUS
Qty: 45 ML | Refills: 3 | Status: SHIPPED | OUTPATIENT
Start: 2024-09-18

## 2024-09-18 RX ORDER — BLOOD SUGAR DIAGNOSTIC
STRIP MISCELLANEOUS
Qty: 300 STRIP | Refills: 3 | Status: SHIPPED | OUTPATIENT
Start: 2024-09-18

## 2024-09-18 RX ORDER — INSULIN GLARGINE 300 U/ML
INJECTION, SOLUTION SUBCUTANEOUS
Qty: 13.5 ML | Refills: 3 | Status: SHIPPED | OUTPATIENT
Start: 2024-09-18

## 2024-11-26 DIAGNOSIS — R03.0 ELEVATED BLOOD PRESSURE READING IN OFFICE WITHOUT DIAGNOSIS OF HYPERTENSION: ICD-10-CM

## 2024-11-26 DIAGNOSIS — E78.5 HYPERLIPIDEMIA LDL GOAL <100: ICD-10-CM

## 2024-11-26 DIAGNOSIS — E10.65 UNCONTROLLED TYPE 1 DIABETES MELLITUS WITH HYPERGLYCEMIA (HCC): ICD-10-CM

## 2024-12-07 LAB
ALBUMIN SERPL-MCNC: 4.5 G/DL (ref 3.8–4.9)
ALP SERPL-CCNC: 60 IU/L (ref 44–121)
ALT SERPL-CCNC: 17 IU/L (ref 0–44)
AST SERPL-CCNC: 18 IU/L (ref 0–40)
BILIRUB SERPL-MCNC: 0.3 MG/DL (ref 0–1.2)
BUN SERPL-MCNC: 24 MG/DL (ref 6–24)
BUN/CREAT SERPL: 21 (ref 9–20)
CALCIUM SERPL-MCNC: 9.2 MG/DL (ref 8.7–10.2)
CHLORIDE SERPL-SCNC: 102 MMOL/L (ref 96–106)
CHOLEST SERPL-MCNC: 212 MG/DL (ref 100–199)
CO2 SERPL-SCNC: 25 MMOL/L (ref 20–29)
CREAT SERPL-MCNC: 1.17 MG/DL (ref 0.76–1.27)
EGFRCR SERPLBLD CKD-EPI 2021: 74 ML/MIN/1.73
GLOBULIN SER CALC-MCNC: 2.2 G/DL (ref 1.5–4.5)
GLUCOSE SERPL-MCNC: 148 MG/DL (ref 70–99)
HBA1C MFR BLD: 8.5 % (ref 4.8–5.6)
HDLC SERPL-MCNC: 50 MG/DL
IMP & REVIEW OF LAB RESULTS: NORMAL
LDLC SERPL CALC-MCNC: 148 MG/DL (ref 0–99)
Lab: NORMAL
POTASSIUM SERPL-SCNC: 4.7 MMOL/L (ref 3.5–5.2)
PROT SERPL-MCNC: 6.7 G/DL (ref 6–8.5)
SODIUM SERPL-SCNC: 142 MMOL/L (ref 134–144)
TRIGL SERPL-MCNC: 76 MG/DL (ref 0–149)
VLDLC SERPL CALC-MCNC: 14 MG/DL (ref 5–40)

## 2024-12-09 ENCOUNTER — OFFICE VISIT (OUTPATIENT)
Age: 54
End: 2024-12-09
Payer: COMMERCIAL

## 2024-12-09 VITALS
HEIGHT: 72 IN | WEIGHT: 236.6 LBS | SYSTOLIC BLOOD PRESSURE: 138 MMHG | HEART RATE: 70 BPM | BODY MASS INDEX: 32.05 KG/M2 | DIASTOLIC BLOOD PRESSURE: 80 MMHG

## 2024-12-09 DIAGNOSIS — E10.65 UNCONTROLLED TYPE 1 DIABETES MELLITUS WITH HYPERGLYCEMIA (HCC): Primary | ICD-10-CM

## 2024-12-09 DIAGNOSIS — E78.5 HYPERLIPIDEMIA LDL GOAL <100: ICD-10-CM

## 2024-12-09 DIAGNOSIS — R03.0 ELEVATED BLOOD PRESSURE READING IN OFFICE WITHOUT DIAGNOSIS OF HYPERTENSION: ICD-10-CM

## 2024-12-09 PROCEDURE — 3052F HG A1C>EQUAL 8.0%<EQUAL 9.0%: CPT | Performed by: INTERNAL MEDICINE

## 2024-12-09 PROCEDURE — 99214 OFFICE O/P EST MOD 30 MIN: CPT | Performed by: INTERNAL MEDICINE

## 2024-12-09 NOTE — PROGRESS NOTES
Chief Complaint   Patient presents with    Diabetes     PCP and pharmacy confirmed     History of Present Illness: Bladimir Dumont is a 54 y.o. male here for follow up of diabetes.  Weight down 4 lbs since last visit in 6/24.  Has cut chips out and eating more eggs and veggies and meat and fruit and veggies and less processed foods.  Still taking 38 units of toujeo at night and fasting sugar was 250 this morning and was 140 before eating some trail mix.  He is still dosing humalog at 1:10 and it's likely that this dose is not enough.  Willing to try dexcom for 10 days to see if this helps him identify trends in his sugars.  Gave him a sample G7  and sensor to try.    he has the following indications to begin treatment with Dexcom:  1) he has type 1 diabetes (E10.65) and is on an intensive insulin regimen with 4 injections per day  2) he tests his blood sugar 3 times per day and makes treatment decisions off his blood sugar readings and will do the same off sensor readings  3) he will require frequent adjustments to his insulin injection doses based on his sensor readings  4) he will benefit from therapeutic continuous glucose monitoring and I recommend that he begin this  5) he is seen in my office every 6 months        Current Outpatient Medications   Medication Sig    ONETOUCH VERIO strip TEST SUGAR THREE TIMES A DAY    TOUJEO SOLOSTAR 300 UNIT/ML concentrated injection pen INJECT 38 UNITS UNDER THE SKIN NIGHTLY OR AS DIRECTED UP TO A MAX OF 45 UNITS DAILY    HUMALOG KWIKPEN 100 UNIT/ML SOPN INJECT 1: 8 FOR CARBOHYDRATES AND 1: 40 GREATER THAN 140 FOR CORRECTION, MAXIMUM 50 UNITS DAILY     No current facility-administered medications for this visit.     No Known Allergies    Review of Systems: PER HPI    Physical Examination:  Blood pressure 138/80, pulse 70, height 1.829 m (6'), weight 107.3 kg (236 lb 9.6 oz).  General: pleasant, no distress, good eye contact   Neck: no carotid

## 2024-12-09 NOTE — PATIENT INSTRUCTIONS
1) Your Hemoglobin A1c (3 month test of blood sugar) was 8.5% up from 8% and we want to get this under 7% which is an average sugar under 150.  Currently your average is about 200.    2) BUN and creatinine are markers of kidney function.  Your values are normal.  I'm not concerned by the BUN/creatinine ratio being low when the individual tests are normal so there are no issues with your kidneys at this time.    3) ALT and AST are markers of liver function.  Your values are normal.    4) Your LDL (bad cholesterol) is 148 and goal is under 100.  Try to limit the amount of fried foods, fatty foods, butter, gravy, red meat, ice cream, cheese and eggs in your diet, which are all high in cholesterol.    5) Your blood pressure is at goal under 140/90.    6) Try the dexcom for the next 10 days and if you like it and want me to get you a prescription for more sensors then call me at [phone].

## 2025-01-08 RX ORDER — INSULIN ASPART 100 [IU]/ML
INJECTION, SOLUTION INTRAVENOUS; SUBCUTANEOUS
Qty: 45 ML | Refills: 3 | Status: SHIPPED | OUTPATIENT
Start: 2025-01-08

## 2025-01-08 RX ORDER — LANCETS 33 GAUGE
EACH MISCELLANEOUS
Qty: 300 EACH | Refills: 3 | Status: SHIPPED | OUTPATIENT
Start: 2025-01-08

## 2025-01-08 RX ORDER — INSULIN GLARGINE 300 U/ML
INJECTION, SOLUTION SUBCUTANEOUS
Qty: 13.5 ML | Refills: 3 | Status: SHIPPED | OUTPATIENT
Start: 2025-01-08

## 2025-01-08 NOTE — TELEPHONE ENCOUNTER
Pt LVM stating he has new insurance and they do not cover Humalog but Novolog is preferred. Pt asked if novolog, toujeo and lancets be sent to [facility] so it will all be on the same billing cycle. Informed pt if he notices any changes or side effects from Novolog, contact the office. Pt verbalized understanding.

## 2025-05-25 DIAGNOSIS — E78.5 HYPERLIPIDEMIA LDL GOAL <100: ICD-10-CM

## 2025-05-25 DIAGNOSIS — E10.65 UNCONTROLLED TYPE 1 DIABETES MELLITUS WITH HYPERGLYCEMIA (HCC): ICD-10-CM

## 2025-05-25 DIAGNOSIS — R03.0 ELEVATED BLOOD PRESSURE READING IN OFFICE WITHOUT DIAGNOSIS OF HYPERTENSION: ICD-10-CM

## 2025-06-11 LAB
ALBUMIN SERPL-MCNC: 4.7 G/DL (ref 3.8–4.9)
ALBUMIN/CREAT UR: 14 MG/G CREAT (ref 0–29)
ALP SERPL-CCNC: 56 IU/L (ref 44–121)
ALT SERPL-CCNC: 16 IU/L (ref 0–44)
AST SERPL-CCNC: 19 IU/L (ref 0–40)
BILIRUB SERPL-MCNC: 0.5 MG/DL (ref 0–1.2)
BUN SERPL-MCNC: 24 MG/DL (ref 6–24)
BUN/CREAT SERPL: 22 (ref 9–20)
CALCIUM SERPL-MCNC: 9.7 MG/DL (ref 8.7–10.2)
CHLORIDE SERPL-SCNC: 102 MMOL/L (ref 96–106)
CHOLEST SERPL-MCNC: 197 MG/DL (ref 100–199)
CO2 SERPL-SCNC: 23 MMOL/L (ref 20–29)
CREAT SERPL-MCNC: 1.08 MG/DL (ref 0.76–1.27)
CREAT UR-MCNC: 281.2 MG/DL
EGFRCR SERPLBLD CKD-EPI 2021: 82 ML/MIN/1.73
GLOBULIN SER CALC-MCNC: 2.3 G/DL (ref 1.5–4.5)
GLUCOSE SERPL-MCNC: 116 MG/DL (ref 70–99)
HBA1C MFR BLD: 8 % (ref 4.8–5.6)
HDLC SERPL-MCNC: 49 MG/DL
IMP & REVIEW OF LAB RESULTS: NORMAL
LDLC SERPL CALC-MCNC: 136 MG/DL (ref 0–99)
Lab: NORMAL
MICROALBUMIN UR-MCNC: 38.3 UG/ML
POTASSIUM SERPL-SCNC: 4.4 MMOL/L (ref 3.5–5.2)
PROT SERPL-MCNC: 7 G/DL (ref 6–8.5)
SODIUM SERPL-SCNC: 142 MMOL/L (ref 134–144)
TRIGL SERPL-MCNC: 65 MG/DL (ref 0–149)
VLDLC SERPL CALC-MCNC: 12 MG/DL (ref 5–40)

## 2025-06-17 ENCOUNTER — OFFICE VISIT (OUTPATIENT)
Age: 55
End: 2025-06-17
Payer: COMMERCIAL

## 2025-06-17 ENCOUNTER — RESULTS FOLLOW-UP (OUTPATIENT)
Age: 55
End: 2025-06-17

## 2025-06-17 VITALS
SYSTOLIC BLOOD PRESSURE: 134 MMHG | WEIGHT: 225 LBS | DIASTOLIC BLOOD PRESSURE: 78 MMHG | HEART RATE: 63 BPM | HEIGHT: 72 IN | BODY MASS INDEX: 30.48 KG/M2

## 2025-06-17 DIAGNOSIS — E10.65 UNCONTROLLED TYPE 1 DIABETES MELLITUS WITH HYPERGLYCEMIA (HCC): Primary | ICD-10-CM

## 2025-06-17 DIAGNOSIS — E78.5 HYPERLIPIDEMIA LDL GOAL <100: ICD-10-CM

## 2025-06-17 DIAGNOSIS — R03.0 ELEVATED BLOOD PRESSURE READING IN OFFICE WITHOUT DIAGNOSIS OF HYPERTENSION: ICD-10-CM

## 2025-06-17 PROCEDURE — 3052F HG A1C>EQUAL 8.0%<EQUAL 9.0%: CPT | Performed by: INTERNAL MEDICINE

## 2025-06-17 PROCEDURE — 99214 OFFICE O/P EST MOD 30 MIN: CPT | Performed by: INTERNAL MEDICINE

## 2025-06-17 RX ORDER — INSULIN GLARGINE 300 U/ML
INJECTION, SOLUTION SUBCUTANEOUS
Qty: 13.5 ML | Refills: 3 | Status: SHIPPED | OUTPATIENT
Start: 2025-06-17

## 2025-06-17 NOTE — PATIENT INSTRUCTIONS
1) Your Hemoglobin A1c (3 month test of blood sugar) has improved from 8.5% to 8% but I'm concerned you are having too many lows with losing 11 lbs since 12/24 so decrease the touejo to 36 units at night.  If you still are having 2 or more readings under 80 overnight or during the day, then further decrease to 34 units.    2) BUN and creatinine are markers of kidney function.  Your values are normal I'm not concerned by the BUN/creatinine ratio being high when the individual tests are normal so there are no issues with your kidneys at this time.    3) ALT and AST are markers of liver function.  Your values are normal.    4) Your urine protein and blood pressure are normal.    5) Your cholesterol is better.    6) If you would like me to send the dexcom G7 sensors with your next shipment of meds, then call us at [phone] and I'll order these for you.

## 2025-06-17 NOTE — PROGRESS NOTES
Chief Complaint   Patient presents with    Diabetes     PCP and Pharmacy verified  Pt consents to [facility]     History of Present Illness: Bladimir Dumont is a 54 y.o. male here for follow up of diabetes.  Weight down 11 lbs since last visit in 12/24.    he has the following indications to begin treatment with Dexcom or freestyle Lizzy:  1) he has type 1 diabetes (E10.65) and is on an intensive insulin regimen with 4 injections per day  2) he tests his blood sugar 3 times per day and makes treatment decisions off his blood sugar readings and will do the same off sensor readings  3) he will require frequent adjustments to his insulin injection doses based on his sensor readings  4) he will benefit from therapeutic continuous glucose monitoring and I recommend that he begin this  5) he is seen in my office every 6 months      History of Present Illness  The patient is a 54-year-old male here for follow-up of diabetes.    He attempted to use the Dexcom device but found the readings inconsistent with his meter, often higher. This discrepancy left him uncertain about the appropriate course of action. He experiences hypoglycemic episodes 2 to 3 times per week, typically during the night or early morning hours and sometimes in the mid-afternoon. He has been making dietary changes, including reducing junk food intake and increasing consumption of meat, dairy, vegetables, and fruits. He reports no leg swelling. He has been adhering to his insulin regimen, which includes Toujeo 38 units at night and Humalog 1 unit per 10 carbs. His insurance company has advised him to switch from Humalog to NovoLog, but he has not yet made this change due to sufficient Humalog supply.    Since December 2019, his weight has decreased from 240 pounds to 225 pounds in June 2024, and he recently reported a weight of 211 pounds at home. He has been experiencing knee pain and stiffness, which led him to seek chiropractic care. He was advised to take

## 2025-07-01 RX ORDER — ACYCLOVIR 400 MG/1
TABLET ORAL
Qty: 9 EACH | Refills: 3 | Status: SHIPPED | OUTPATIENT
Start: 2025-07-01

## 2025-07-01 RX ORDER — BLOOD SUGAR DIAGNOSTIC
STRIP MISCELLANEOUS
Qty: 300 STRIP | Refills: 3 | Status: SHIPPED | OUTPATIENT
Start: 2025-07-01

## 2025-07-01 NOTE — TELEPHONE ENCOUNTER
Requested Prescriptions     Pending Prescriptions Disp Refills    ONETOUCH VERIO strip 300 strip 3     Sig: As needed.TEST SUGAR THREE TIMES A DAY    Continuous Glucose Sensor (DEXCOM G7 SENSOR) MISC 9 each 3     Sig: Change sensor every 10 days.

## 2025-08-11 DIAGNOSIS — E10.65 UNCONTROLLED TYPE 1 DIABETES MELLITUS WITH HYPERGLYCEMIA (HCC): Primary | ICD-10-CM

## 2025-08-11 RX ORDER — LANCETS
EACH MISCELLANEOUS
Qty: 300 EACH | Refills: 3 | Status: SHIPPED | OUTPATIENT
Start: 2025-08-11

## 2025-08-11 RX ORDER — BLOOD SUGAR DIAGNOSTIC
STRIP MISCELLANEOUS
Qty: 300 EACH | Refills: 3 | Status: SHIPPED | OUTPATIENT
Start: 2025-08-11